# Patient Record
Sex: MALE
[De-identification: names, ages, dates, MRNs, and addresses within clinical notes are randomized per-mention and may not be internally consistent; named-entity substitution may affect disease eponyms.]

---

## 2018-11-07 ENCOUNTER — HOSPITAL (OUTPATIENT)
Dept: OTHER | Age: 28
End: 2018-11-07
Attending: EMERGENCY MEDICINE

## 2019-07-13 ENCOUNTER — HOSPITAL ENCOUNTER (OUTPATIENT)
Age: 29
Discharge: HOME | End: 2019-07-13
Payer: COMMERCIAL

## 2019-07-13 VITALS
OXYGEN SATURATION: 100 % | DIASTOLIC BLOOD PRESSURE: 67 MMHG | HEART RATE: 100 BPM | RESPIRATION RATE: 20 BRPM | TEMPERATURE: 98 F | SYSTOLIC BLOOD PRESSURE: 110 MMHG

## 2019-07-13 DIAGNOSIS — F41.9 ANXIETY: ICD-10-CM

## 2019-07-13 DIAGNOSIS — B27.90 INFECTIOUS MONONUCLEOSIS WITHOUT COMPLICATION, INFECTIOUS MONONUCLEOSIS DUE TO UNSPECIFIED ORGANISM: Primary | ICD-10-CM

## 2019-07-13 PROCEDURE — 99202 OFFICE O/P NEW SF 15 MIN: CPT

## 2019-07-13 PROCEDURE — 99212 OFFICE O/P EST SF 10 MIN: CPT

## 2019-07-13 RX ORDER — BUPROPION HYDROCHLORIDE 150 MG/1
150 TABLET ORAL DAILY
COMMUNITY
End: 2019-07-26

## 2019-07-13 NOTE — ED PROVIDER NOTES
Patient presents with: Follow - Up      HPI:     Tricia Hyman is a 29year old male who presents for evaluation of a chief complaint of wanting to be rechecked for mononucleosis.   The patient states he was diagnosed 2 weeks ago at [facility] Substance and Sexual Activity      Alcohol use: Not on file      Drug use: Not on file      Sexual activity: Not on file    Lifestyle      Physical activity:        Days per week: Not on file        Minutes per session: Not on file      Stress: Not on file had a long discussion on how mononucleosis is a virus that needs to run its course. We discussed that it can last up until couple months. I explained to him that I would not be the provider to change his psychiatric medication.   I will refer him to a thompson

## 2019-07-13 NOTE — PROGRESS NOTES
Patient presents today with 2 main issues #1 he was diagnosed with mononucleosis from EBV on July 2. He had been at a walk-in clinic and had complained of a lot of fatigue real hard time focusing and concentrating on his work.   They diagnosed him with mon dysuria, hematuria.   Respiratory:  no cough, dyspnea, or wheezing    Well-hydrated well nourished male  Normocephalic atraumatic head  Thyroid was without enlargement or nodularity  Posterior pharynx was normal.  Palate was normal  Lungs were clear bilater

## 2019-07-26 RX ORDER — BUPROPION HYDROCHLORIDE 150 MG/1
150 TABLET ORAL DAILY
Qty: 30 TABLET | Refills: 0 | Status: SHIPPED | OUTPATIENT
Start: 2019-07-26

## 2019-07-26 NOTE — TELEPHONE ENCOUNTER
Per pt he came in to see  On 7/13 and was advised to f/u with psychiatrist. However, pt wont be able to see the psychiatrist till 2 weeks from now. He is requesting a refill on this medication till then.          buPROPion HCl ER, XL, 150 MG Oral Tablet

## 2019-07-26 NOTE — TELEPHONE ENCOUNTER
Please see message below and advise.   Reported as External    Recent Visits  Date Type Provider Dept   07/13/19 Office Visit Balta Comment, DO [facility]   Showing recent visits within past 540 days with a meds authorizing provider and meeting a

## (undated) NOTE — LETTER
7/13/2019              Caleb [address] [facility] [place] [id number]         To Whom it May Concern:    Caleb has mononucleosis. He is to be off school until August 19th 2019. Sincerely,    Marissa Panda DO  [facility]